# Patient Record
Sex: FEMALE | Race: WHITE | NOT HISPANIC OR LATINO | URBAN - METROPOLITAN AREA
[De-identification: names, ages, dates, MRNs, and addresses within clinical notes are randomized per-mention and may not be internally consistent; named-entity substitution may affect disease eponyms.]

---

## 2022-02-21 ENCOUNTER — APPOINTMENT (RX ONLY)
Dept: URBAN - METROPOLITAN AREA CLINIC 33 | Facility: CLINIC | Age: 27
Setting detail: DERMATOLOGY
End: 2022-02-21

## 2022-02-21 VITALS
SYSTOLIC BLOOD PRESSURE: 126 MMHG | RESPIRATION RATE: 16 BRPM | WEIGHT: 133 LBS | HEIGHT: 64 IN | HEART RATE: 92 BPM | DIASTOLIC BLOOD PRESSURE: 84 MMHG

## 2022-02-21 DIAGNOSIS — L57.8 OTHER SKIN CHANGES DUE TO CHRONIC EXPOSURE TO NONIONIZING RADIATION: ICD-10-CM

## 2022-02-21 DIAGNOSIS — R03.0 ELEVATED BLOOD-PRESSURE READING, WITHOUT DIAGNOSIS OF HYPERTENSION: ICD-10-CM

## 2022-02-21 DIAGNOSIS — Z23 ENCOUNTER FOR IMMUNIZATION: ICD-10-CM

## 2022-02-21 DIAGNOSIS — L71.0 PERIORAL DERMATITIS: ICD-10-CM

## 2022-02-21 PROCEDURE — 99203 OFFICE O/P NEW LOW 30 MIN: CPT

## 2022-02-21 PROCEDURE — ? PLAN FOR BMI MANAGEMENT

## 2022-02-21 PROCEDURE — ? PRESCRIPTION

## 2022-02-21 PROCEDURE — ? COUNSELING

## 2022-02-21 RX ORDER — METRONIDAZOLE 7.5 MG/G
PEA SIZE 1/4- 1/2 GM CREAM TOPICAL BID
Qty: 45 | Refills: 0 | Status: ERX | COMMUNITY
Start: 2022-02-21

## 2022-02-21 RX ORDER — DOXYCYCLINE 100 MG/1
1 CAPSULE CAPSULE ORAL ONCE DAILY
Qty: 30 | Refills: 0 | Status: ERX | COMMUNITY
Start: 2022-02-21

## 2022-02-21 RX ADMIN — METRONIDAZOLE PEA SIZE 1/4- 1/2 GM: 7.5 CREAM TOPICAL at 00:00

## 2022-02-21 RX ADMIN — DOXYCYCLINE 1 CAPSULE: 100 CAPSULE ORAL at 00:00

## 2022-02-21 ASSESSMENT — LOCATION DETAILED DESCRIPTION DERM
LOCATION DETAILED: LEFT MEDIAL MALAR CHEEK
LOCATION DETAILED: LEFT INFERIOR MEDIAL MALAR CHEEK
LOCATION DETAILED: RIGHT INFERIOR MEDIAL MALAR CHEEK

## 2022-02-21 ASSESSMENT — LOCATION SIMPLE DESCRIPTION DERM
LOCATION SIMPLE: RIGHT CHEEK
LOCATION SIMPLE: LEFT CHEEK

## 2022-02-21 ASSESSMENT — LOCATION ZONE DERM: LOCATION ZONE: FACE

## 2022-02-21 NOTE — PROCEDURE: COUNSELING
Detail Level: Detailed
Quality 317: Preventative Care And Screening: Screening For High Blood Pressure And Follow-Up Documented: Pre-hypertensive or hypertensive blood pressure reading documented, and the indicated follow-up is documented
Quality 110: Preventive Care And Screening: Influenza Immunization: Influenza Immunization not Administered because Patient Refused.
Detail Level: Simple
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 She does have perinasal dermatitis and tendency for rosacea.  We reviewed this in detail and due to her travels best equipped to supply her with topical and oral at this time.  We will do only 30 days of oral and reviewed how to take this risks and potential adverse effects and also start topical. Also advised to make sure she doesn't get irritants, she notes some of her retinol she has put there, into these regions- skin folds and creases.  She voiced understanding.\\n
Moisturizer Recommendations: CeraVe or Cetaphil Line, or Vanicare
Cleanser Recommendations: CeraVe or Cetaphil Line or Vanicare
Topical Retinoids Recommendations: Prescriptive -0.025%- Tretinoin
Sunscreen Recommendations: Recommend SPF 30+ reapply every 2 hours- people tend to like: Neutrogena, [place] [place], CeraVe, Blue Lizard\\n\\nSun protective clothing - such as that found at [employer] and many other retailers
Patient Specific Counseling (Will Not Stick From Patient To Patient): Discussed - that the spot on the cheek is a sun spot/sun freckle.  Reassured.\\n\\nWe talked about skin care and products that may help her- however can be irritating especially with her concerns about rosacea in her future.\\n\\nIf she is home in the summer we will meet again.